# Patient Record
Sex: MALE | Race: WHITE | Employment: OTHER | URBAN - METROPOLITAN AREA
[De-identification: names, ages, dates, MRNs, and addresses within clinical notes are randomized per-mention and may not be internally consistent; named-entity substitution may affect disease eponyms.]

---

## 2019-03-31 ENCOUNTER — HOSPITAL ENCOUNTER (EMERGENCY)
Age: 39
Discharge: HOME | End: 2019-03-31
Payer: COMMERCIAL

## 2019-03-31 VITALS
WEIGHT: 315 LBS | DIASTOLIC BLOOD PRESSURE: 77 MMHG | BODY MASS INDEX: 38.36 KG/M2 | OXYGEN SATURATION: 98 % | RESPIRATION RATE: 15 BRPM | SYSTOLIC BLOOD PRESSURE: 135 MMHG | HEIGHT: 76 IN | TEMPERATURE: 97.8 F | HEART RATE: 85 BPM

## 2019-03-31 DIAGNOSIS — M62.838 SPASM OF MUSCLE: Primary | ICD-10-CM

## 2019-03-31 DIAGNOSIS — S39.012A STRAIN OF LUMBAR REGION, INITIAL ENCOUNTER: ICD-10-CM

## 2019-03-31 PROCEDURE — 96374 THER/PROPH/DIAG INJ IV PUSH: CPT

## 2019-03-31 PROCEDURE — 6360000002 HC RX W HCPCS: Performed by: PHYSICIAN ASSISTANT

## 2019-03-31 PROCEDURE — 99283 EMERGENCY DEPT VISIT LOW MDM: CPT

## 2019-03-31 PROCEDURE — 96375 TX/PRO/DX INJ NEW DRUG ADDON: CPT

## 2019-03-31 RX ORDER — ATORVASTATIN CALCIUM 10 MG/1
40 TABLET, FILM COATED ORAL DAILY
COMMUNITY

## 2019-03-31 RX ORDER — OMEGA-3-ACID ETHYL ESTERS 1 G/1
2 CAPSULE, LIQUID FILLED ORAL 2 TIMES DAILY
COMMUNITY

## 2019-03-31 RX ORDER — ORPHENADRINE CITRATE 30 MG/ML
60 INJECTION INTRAMUSCULAR; INTRAVENOUS ONCE
Status: COMPLETED | OUTPATIENT
Start: 2019-03-31 | End: 2019-03-31

## 2019-03-31 RX ORDER — LISINOPRIL AND HYDROCHLOROTHIAZIDE 25; 20 MG/1; MG/1
1 TABLET ORAL DAILY
COMMUNITY

## 2019-03-31 RX ORDER — CYCLOBENZAPRINE HCL 10 MG
10 TABLET ORAL 3 TIMES DAILY PRN
Qty: 20 TABLET | Refills: 0 | Status: SHIPPED | OUTPATIENT
Start: 2019-03-31 | End: 2019-04-07

## 2019-03-31 RX ORDER — AMLODIPINE BESYLATE 10 MG/1
10 TABLET ORAL DAILY
COMMUNITY

## 2019-03-31 RX ORDER — LIDOCAINE 50 MG/G
1 PATCH TOPICAL DAILY
Qty: 30 PATCH | Refills: 0 | Status: SHIPPED | OUTPATIENT
Start: 2019-03-31 | End: 2019-04-30

## 2019-03-31 RX ORDER — ATORVASTATIN CALCIUM 40 MG/1
40 TABLET, FILM COATED ORAL DAILY
COMMUNITY

## 2019-03-31 RX ORDER — KETOROLAC TROMETHAMINE 30 MG/ML
60 INJECTION, SOLUTION INTRAMUSCULAR; INTRAVENOUS ONCE
Status: COMPLETED | OUTPATIENT
Start: 2019-03-31 | End: 2019-03-31

## 2019-03-31 RX ORDER — NAPROXEN 500 MG/1
500 TABLET ORAL 2 TIMES DAILY
Qty: 20 TABLET | Refills: 0 | Status: SHIPPED | OUTPATIENT
Start: 2019-03-31

## 2019-03-31 RX ADMIN — ORPHENADRINE CITRATE 60 MG: 60 INJECTION INTRAMUSCULAR; INTRAVENOUS at 18:01

## 2019-03-31 RX ADMIN — KETOROLAC TROMETHAMINE 60 MG: 30 INJECTION, SOLUTION INTRAMUSCULAR at 18:02

## 2019-03-31 ASSESSMENT — PAIN DESCRIPTION - LOCATION: LOCATION: BACK

## 2019-03-31 ASSESSMENT — PAIN SCALES - GENERAL
PAINLEVEL_OUTOF10: 8
PAINLEVEL_OUTOF10: 4
PAINLEVEL_OUTOF10: 10

## 2019-03-31 ASSESSMENT — ENCOUNTER SYMPTOMS
ABDOMINAL PAIN: 0
COLOR CHANGE: 0
SHORTNESS OF BREATH: 0
VOMITING: 0
NAUSEA: 0
WHEEZING: 0
BACK PAIN: 1

## 2019-03-31 NOTE — ED PROVIDER NOTES
[facility] [facility]  eMERGENCY dEPARTMENT eNCOUnter        Pt Name: Katherine Quintero  MRN: [id number]  [place] 1980  Date of evaluation: 3/31/2019  Provider: Ifeoma Sy PA-C  PCP: Nicole Donovan MD    This patient was not seen and evaluated by the attending physician No att. providers found. CHIEF COMPLAINT       Chief Complaint   Patient presents with    Back Pain     back pain after lifting heavy objects Friday morning. HISTORY OF PRESENT ILLNESS   (Location/Symptom, Timing/Onset, Context/Setting, Quality, Duration, Modifying Factors, Severity)  Note limiting factors. Katherine Quintero is a 43 y.o. male patient presents the emergency department for evaluation of low back pain. Patient states he was lifting heavy objects on Friday when he strained his lower back. Patient has been using icy hot and ibuprofen at home with moderate relief. Patient states then he started having muscle spasms in his upper back. The patient rates his current pain an 8/10. He denies any other symptoms including headache, lightheadedness, dizziness, chest pain, shortness of breath, abdominal pain, nausea, vomiting, diarrhea. He denies any groin numbness or loss of bowel or bladder. He denies any numbness or tingling down into his legs. Nursing Notes were all reviewed and agreed with or any disagreements were addressed  in the HPI. REVIEW OF SYSTEMS    (2-9 systems for level 4, 10 or more for level 5)     Review of Systems   Constitutional: Negative for fatigue and fever. HENT: Negative. Eyes: Negative for visual disturbance. Respiratory: Negative for shortness of breath and wheezing. Cardiovascular: Negative for chest pain and palpitations. Gastrointestinal: Negative for abdominal pain, nausea and vomiting. Genitourinary: Negative for dysuria. Musculoskeletal: Positive for back pain.  Negative for arthralgias, gait problem, joint swelling, myalgias, neck pain and neck stiffness. Skin: Negative for color change, pallor, rash and wound. Neurological: Negative for dizziness, syncope, light-headedness and headaches. Positives and Pertinent negatives as per HPI. Except as noted abovein the ROS, all other systems were reviewed and negative. PAST MEDICAL HISTORY     Past Medical History:   Diagnosis Date    Hypertension     Sleep apnea          SURGICAL HISTORY     Past Surgical History:   Procedure Laterality Date    LAPAROSCOPIC APPENDECTOMY  1/1/13    Laparoscopic appendectomy         CURRENTMEDICATIONS       Previous Medications    ATORVASTATIN (LIPITOR) 10 MG TABLET    Take by mouth daily    LISINOPRIL PO    Take by mouth    OMEGA-3 ACID ETHYL ESTERS (LOVAZA) 1 G CAPSULE    Take 2 g by mouth 2 times daily    OMEGA-3 FATTY ACIDS (OMEGA-3 FISH OIL PO)    Take by mouth 2 times daily    ONDANSETRON (ZOFRAN ODT) 4 MG DISINTEGRATING TABLET    Take 1-2 tablets by mouth every 12 hours as needed for Nausea. May Sub regular tablet (non-ODT) if insurance does not cover ODT. ALLERGIES     Patient has no known allergies. FAMILYHISTORY     No family history on file.        SOCIAL HISTORY       Social History     Socioeconomic History    Marital status:      Spouse name: Not on file    Number of children: Not on file    Years of education: Not on file    Highest education level: Not on file   Occupational History    Not on file   Social Needs    Financial resource strain: Not on file    Food insecurity:     Worry: Not on file     Inability: Not on file    Transportation needs:     Medical: Not on file     Non-medical: Not on file   Tobacco Use    Smoking status: Never Smoker   Substance and Sexual Activity    Alcohol use: Yes     Comment: socially    Drug use: Not on file    Sexual activity: Yes     Partners: Female   Lifestyle    Physical activity:     Days per week: Not on file     Minutes per session: Not on file    Stress: Not on file   Relationships    Social connections:     Talks on phone: Not on file     Gets together: Not on file     Attends [employer] service: Not on file     Active member of club or organization: Not on file     Attends meetings of clubs or organizations: Not on file     Relationship status: Not on file    Intimate partner violence:     Fear of current or ex partner: Not on file     Emotionally abused: Not on file     Physically abused: Not on file     Forced sexual activity: Not on file   Other Topics Concern    Not on file   Social History Narrative    Not on file       SCREENINGS             PHYSICAL EXAM    (up to 7 for level 4, 8 or more for level 5)     ED Triage Vitals [03/31/19 1634]   BP Temp Temp Source Pulse Resp SpO2 Height Weight   135/77 97.8 °F (36.6 °C) Oral 85 15 98 % 6' 4\" (1.93 m) (!) 330 lb (149.7 kg)       Physical Exam   Constitutional: He is oriented to person, place, and time. He appears well-developed and well-nourished. HENT:   Head: Normocephalic and atraumatic. Nose: Nose normal.   Eyes: Right eye exhibits no discharge. Left eye exhibits no discharge. Neck: Normal range of motion. Neck supple. Cardiovascular: Normal rate, regular rhythm and normal heart sounds. Exam reveals no gallop. No murmur heard. Pulmonary/Chest: Effort normal and breath sounds normal. No respiratory distress. He has no wheezes. He has no rales. Musculoskeletal: Normal range of motion. Cervical back: Normal.        Thoracic back: Normal.        Lumbar back: Normal.        Back:    Neurological: He is alert and oriented to person, place, and time. Skin: Skin is warm and dry. He is not diaphoretic. No pallor. Psychiatric: He has a normal mood and affect. His behavior is normal.   Nursing note and vitals reviewed. DIAGNOSTIC RESULTS   LABS:    Labs Reviewed - No data to display    All other labs were within normal range or not returned as of this dictation. EKG:  All EKG's are

## 2019-05-10 ENCOUNTER — HOSPITAL ENCOUNTER (OUTPATIENT)
Dept: GENERAL RADIOLOGY | Age: 39
Discharge: HOME | End: 2019-05-10
Payer: COMMERCIAL

## 2019-05-10 ENCOUNTER — HOSPITAL ENCOUNTER (OUTPATIENT)
Age: 39
Discharge: HOME | End: 2019-05-10
Payer: COMMERCIAL

## 2019-05-10 DIAGNOSIS — M54.6 THORACIC BACK PAIN, UNSPECIFIED BACK PAIN LATERALITY, UNSPECIFIED CHRONICITY: ICD-10-CM

## 2019-05-10 DIAGNOSIS — M54.5 LOW BACK PAIN, UNSPECIFIED BACK PAIN LATERALITY, UNSPECIFIED CHRONICITY, WITH SCIATICA PRESENCE UNSPECIFIED: ICD-10-CM

## 2019-05-10 PROCEDURE — 72070 X-RAY EXAM THORAC SPINE 2VWS: CPT

## 2019-05-10 PROCEDURE — 72100 X-RAY EXAM L-S SPINE 2/3 VWS: CPT

## 2019-05-21 ENCOUNTER — HOSPITAL ENCOUNTER (OUTPATIENT)
Dept: PHYSICAL THERAPY | Age: 39
Setting detail: THERAPIES SERIES
Discharge: HOME | End: 2019-05-21
Payer: COMMERCIAL

## 2019-05-21 PROCEDURE — 97110 THERAPEUTIC EXERCISES: CPT | Performed by: PHYSICAL THERAPIST

## 2019-05-21 PROCEDURE — 97161 PT EVAL LOW COMPLEX 20 MIN: CPT | Performed by: PHYSICAL THERAPIST

## 2019-05-21 PROCEDURE — 97140 MANUAL THERAPY 1/> REGIONS: CPT | Performed by: PHYSICAL THERAPIST

## 2019-05-21 NOTE — PLAN OF CARE
[place], [facility], [address]  Phone: [phone]   Fax: [phone]     Physical Therapy Certification    Dear Referring Practitioner: Dr. Nakia Aguilar,    We had the pleasure of evaluating the following patient for physical therapy services at [address] [place] [address]. A summary of our findings can be found in the initial assessment below. This includes our plan of care. If you have any questions or concerns regarding these findings, please do not hesitate to contact me at the office phone number checked above. Thank you for the referral.       Physician Signature:_______________________________Date:__________________  By signing above (or electronic signature), therapists plan is approved by physician      Patient: Marquis Bentley \"Lino\"  : 1980   MRN: [id number]  Referring Physician: Referring Practitioner: Dr. Nakia Aguilar      Evaluation Date: 2019      Medical Diagnosis Information:  Diagnosis: M54.5 low back pain   Treatment Diagnosis: M54.6 thoracic pain                                         Insurance information: PT Insurance Information: [employer]     Precautions/ Contra-indications: none  Latex Allergy:  [x]NO      []YES  Preferred Language for Healthcare:   [x]English       []other:    SUBJECTIVE: Patient stated complaint: early April pt. Was helping someone carry a very heavy item into the car and the other person dropped their end causing R sided low back pain; pt. Notes that he has had this type of pulled muscle but then a couple of days later pt. States that he started to get bad muscle spasms in mid back on R side; pt.  Denies sleep disturbances but notes that his back is very stiff when first getting up; pain/stiffness/muscle spasm is worse after prolonged positioning (30+ minutes); currently spasms are occurring 1-2x/day, and has tightness (feeling prior to spasm) about additional 2-4x/day, pt. 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    Living Status/Prior Level of Function: Prior to this injury / incident, pt was independent with ADLs and IADLs, coaching baseball, 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      OBJECTIVE:     Palpation: tenderness lower thoracic paraspinals    Functional Mobility/Transfers: independent    Posture: slight lateral curve throughout spine with concavity to the R   Skin fold L ~T8, skin fold R ~T10    Breathing: increased lower rib excursion on L than R   No gross individual rib displacement    Gait: (include devices/WB status) decreased trunk rotation, mild guarding    Bandages/Dressings/Incisions: NA    Dermatomes Normal Abnormal Comments   inguinal area (L1)  x     anterior mid-thigh (L2) x     distal ant thigh/med knee (L3) x     medial lower leg and foot (L4) x     lateral lower leg and foot (L5) x     posterior calf (S1) x     medial calcaneus (S2) x         Myotomes Normal Abnormal Comments   Hip flexion (L1-L2) x     Knee extension (L2-L4) x     Dorsiflexion (L4-L5) x     Great Toe Ext (L5) x     Ankle Eversion (S1-S2) x     Ankle PF(S1-S2) x         Reflexes - NT Normal Abnormal Comments   S1-2 Seated achilles      S1-2 Prone knee bend      L3-4 Patellar tendon      Clonus      Babinski          ROM  Comments   Lumbothoracic Flex ~50% restriction \"tightness\"   Lumbothoracic Ext ~25% restriction      ROM LEFT RIGHT Comments   Thoracic Side Bend Slight restricted Increased mobility -hinge point ~ T10 (allows greater motion R and restricted L)   Thoracic Rotation Slight restriction Slight restriction symmetrical   Hamstring Flex NT NT                    Strength LEFT RIGHT Comments   Multifidus NT NT    Transverse Ab NT NT        Joint mobility: thoracic spine mobility   []Normal    [x]Hypo - throughout   []Hyper    Orthopedic Special Tests: NT      [x] Patient history, allergies, meds reviewed. Medical chart reviewed. See intake form. Review Of Systems (ROS):  [x]Performed Review of systems (Integumentary, CardioPulmonary, Neurological) by intake and observation. Intake form has been scanned into medical record. Patient has been instructed to contact their primary care physician regarding ROS issues if not already being addressed at this time.       Co-morbidities/Complexities (which will affect course of rehabilitation):   []None           Arthritic conditions   []Rheumatoid arthritis (M05.9)  []Osteoarthritis (M19.91)   Cardiovascular conditions   [x]Hypertension (I10)  []Hyperlipidemia (E78.5)  []Angina pectoris (I20)  []Atherosclerosis (I70)   Musculoskeletal conditions   []Disc pathology   []Congenital spine pathologies   []Prior surgical intervention  []Osteoporosis (M81.8)  []Osteopenia (M85.8)   Endocrine conditions   []Hypothyroid (E03.9)  []Hyperthyroid Gastrointestinal conditions   []Constipation (G56.15)   Metabolic conditions   []Morbid obesity (E66.01)  []Diabetes type 1(E10.65) or 2 (E11.65)   []Neuropathy (G60.9)     Pulmonary conditions   []Asthma (J45)  []Coughing   []COPD (J44.9)   Psychological Disorders  []Anxiety (F41.9)  []Depression (F32.9)   []Other:   []Other:           Barriers to/and or personal factors that will affect rehab potential:              []Age  []Sex    []Smoker              []Motivation/Lack of Motivation                        [x]Co-Morbidities              []Cognitive Function, education/learning barriers              []Environmental, home barriers functional activities including lifting without increased symptoms or restriction. 5. 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        Electronically signed by:  William Mgaaña PT

## 2019-05-21 NOTE — FLOWSHEET NOTE
[address] [facility]    Physical Therapy Daily Treatment Note  Date:  2019    Patient Name:  Esau Verma    :  1980  MRN: [id number]  Medical/Treatment Diagnosis Information:  Diagnosis: M54.5 low back pain  Treatment Diagnosis: M54.6 thoracic pain  Insurance/Certification information:  PT Insurance Information: [employer]  Physician Information:  Referring Practitioner: Dr. Janis Shipley of care signed (Y/N):     Date of Patient follow up with Physician:     Progress Note: []  Yes  []  No  Next due by: Visit #10      Latex Allergy:  [x]NO      []YES  Preferred Language for Healthcare:   [x]English       []other:    Visit # Insurance Allowable Date Range   1 30 n/a     Pain level:  0/10 currently, 6-7/10 with muscle spasm     SUBJECTIVE:  See eval    OBJECTIVE: See eval   Observation:    Test measurements:      RESTRICTIONS/PRECAUTIONS: none    Exercises/Interventions:     Therapeutic Exercise / Activities Resistance / level sets/sec reps notes   Open book - B  10\" 10    Cat/camel  10\" 10    Seated thoracic extension in chair  10\" 10           W breathing  1 10           Lift training npv                           Neuromuscular Re-ed                                          Manual Intervention        Prone PA T8-11 UPA 4'     GISTM/STM R lower thoracic paraspinals 5'     Lumbar Manip       SI Manip       Hip belt mobs       Hip LA distraction                              Pt. Education:  -pt educated on diagnosis, prognosis and expectations for rehab  -pt provided with written and illustrated instructions for HEP  -all pt questions were answered    Therapeutic Exercise and NMR EXR  [x] (33231) Provided verbal/tactile cueing for activities related to strengthening, flexibility, endurance, ROM  for improvements in proximal hip and core control with self care, mobility, lifting and ambulation.  [] (44410) Provided verbal/tactile cueing for activities related to improving balance, coordination, kinesthetic sense, posture, motor skill, proprioception  to assist with core control in self care, mobility, lifting, and ambulation. Therapeutic Activities:    [] (56810 or 50965) Provided verbal/tactile cueing for activities related to improving balance, coordination, kinesthetic sense, posture, motor skill, proprioception and motor activation to allow for proper function  with self care and ADLs  [] (63835) Provided training and instruction to the patient for proper core and proximal hip recruitment and positioning with ambulation re-education     Home Exercise Program:    [x] (17922) Reviewed/Progressed HEP activities related to strengthening, flexibility, endurance, ROM of core, proximal hip and LE for functional self-care, mobility, lifting and ambulation   [] (23222) Reviewed/Progressed HEP activities related to improving balance, coordination, kinesthetic sense, posture, motor skill, proprioception of core, proximal hip and LE for self care, mobility, lifting, and ambulation      Manual Treatments:  PROM / STM / Oscillations-Mobs:  G-I, II, III, IV (PA's, Inf., Post.)  [x] (42634) Provided manual therapy to mobilize proximal hip and LS spine soft tissue/joints for the purpose of modulating pain, promoting relaxation,  increasing ROM, reducing/eliminating soft tissue swelling/inflammation/restriction, improving soft tissue extensibility and allowing for proper ROM for normal function with self care, mobility, lifting and ambulation.      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 Independent in HEP and progression per patient tolerance, in order to prevent re-injury. 2. Patient will have a decrease in pain to facilitate improvement in movement, function, and ADLs as indicated by Functional Deficits. Long Term Goals: To be achieved in: 4-6 weeks  1. Disability index score of 10% or less for the TL to assist with reaching prior level of function. 2. Patient will demonstrate increased AROM to WNL, functional TS mobility, and symmetrical rib excursion with breathing to allow for proper joint functioning as indicated by patients Functional Deficits. 3. Patient will demonstrate an increase in Strength to good core activation to allow for proper functional mobility as indicated by patients Functional Deficits. 4. Patient will return to functional activities including lifting without increased symptoms or restriction. 5. 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 Progression Towards Functional goals:  [] Patient is progressing as expected towards functional goals listed. [] Progression is slowed due to complexities listed. [] Progression has been slowed due to co-morbidities.   [x] Plan just implemented, too soon to assess goals progression  [] Other:     Persisting Functional Limitations/Impairments:  []Sitting []Standing   []Walking []Squatting/bending    []Stairs [x]ADL's    [x]Transfers [x]Reaching  [x]Housework [x]Job related tasks  [x]Driving [x]Sports/Recreation   []Other:    ASSESSMENT:  See eval  Treatment/Activity Tolerance:  [x] Patient tolerated treatment well [] Patient limited by fatique  [] Patient limited by pain  [] Patient limited by other medical complications  [] Other:      Prognosis: [x] Good [] Fair  [] Poor    Patient Requires Follow-up: [x] Yes  [] No    PLAN: See eval. PT 1-2x / week for 4-6 weeks.    [] Continue per plan of care [] Alter current plan (see comments)  [x] Plan of care initiated [] Hold pending MD visit [] Discharge    Electronically signed by: Michael Garcia PT, DPT

## 2019-05-23 ENCOUNTER — HOSPITAL ENCOUNTER (OUTPATIENT)
Dept: PHYSICAL THERAPY | Age: 39
Setting detail: THERAPIES SERIES
Discharge: HOME | End: 2019-05-23
Payer: COMMERCIAL

## 2019-05-23 PROCEDURE — 97140 MANUAL THERAPY 1/> REGIONS: CPT | Performed by: PHYSICAL THERAPIST

## 2019-05-23 PROCEDURE — 97530 THERAPEUTIC ACTIVITIES: CPT | Performed by: PHYSICAL THERAPIST

## 2019-05-23 PROCEDURE — 97110 THERAPEUTIC EXERCISES: CPT | Performed by: PHYSICAL THERAPIST

## 2019-05-23 NOTE — FLOWSHEET NOTE
[address] [facility]    Physical Therapy Daily Treatment Note  Date:  2019    Patient Name:  Toney Huffman    :  1980  MRN: [id number]  Medical/Treatment Diagnosis Information:  Diagnosis: M54.5 low back pain  Treatment Diagnosis: M54.6 thoracic pain  Insurance/Certification information:  PT [facility] Information[place] [facility] [place]  Physician Information:  Referring Practitioner: Dr. Akin Boothe of care signed (Y/N): Y    Date of Patient follow up with Physician:     Progress Note: []  Yes  []  No  Next due by: Visit #10      Latex Allergy:  [x]NO      []YES  Preferred Language for Healthcare:   [x]English       []other:    Visit # Insurance Allowable Date Range   2 30 n/a     Pain level:  0/10 currently, 8/10 with muscle spasm (on R side) caused by quick extension and rotation to his side to avoid ball     SUBJECTIVE:  Pt. Reports feeling better after last therapy session, but still is feeling stiff in the morning. Pt noted only having one R thoracic muscle spasm after trunk extending backwards and rotating to the L while depressing his L shoulder to avoid a ball coming at him. Pt. States he feels tight ever since the incident but reports he hasn't had any other muscle spasms since last session. Pt. Is compliant with HEP and feels relief with exercises.       OBJECTIVE: See eval   Observation:    Test measurements:      RESTRICTIONS/PRECAUTIONS: none    Exercises/Interventions:     Therapeutic Exercise / Activities Resistance / level sets/sec reps notes   Open book - B  10\" 10    Cat/camel  10\" 10    Seated thoracic extension in chair  10\" 10    R levator self stretch  10\" 10 Start , cueing for posturing          W breathing  1 10           Lift training #0  #20  1  1 15  15 Start                  Scapular retraction  0# 3 10           Neuromuscular Re-ed                                          Manual Intervention        Prone PA T8-11 UPA 6' GISTM/STM R lower thoracic paraspinals, trigger point 7'     Lumbar Manip       SI Manip       Hip belt mobs       Hip LA distraction                              Pt. Education:  -reviewed HEP  - educated on usage of proper body mechanics and posture     Therapeutic Exercise and NMR EXR  [x] (72406) Provided verbal/tactile cueing for activities related to strengthening, flexibility, endurance, ROM  for improvements in proximal hip and core control with self care, mobility, lifting and ambulation.  [] (81242) Provided verbal/tactile cueing for activities related to improving balance, coordination, kinesthetic sense, posture, motor skill, proprioception  to assist with core control in self care, mobility, lifting, and ambulation.      Therapeutic Activities:    [x] (35668 or 21915) Provided verbal/tactile cueing for activities related to improving balance, coordination, kinesthetic sense, posture, motor skill, proprioception and motor activation to allow for proper function  with self care and ADLs  [] (95882) Provided training and instruction to the patient for proper core and proximal hip recruitment and positioning with ambulation re-education     Home Exercise Program:    [x] (20212) Reviewed/Progressed HEP activities related to strengthening, flexibility, endurance, ROM of core, proximal hip and LE for functional self-care, mobility, lifting and ambulation   [] (70308) Reviewed/Progressed HEP activities related to improving balance, coordination, kinesthetic sense, posture, motor skill, proprioception of core, proximal hip and LE for self care, mobility, lifting, and ambulation      Manual Treatments:  PROM / STM / Oscillations-Mobs:  G-I, II, III, IV (PA's, Inf., Post.)  [x] (64714) Provided manual therapy to mobilize proximal hip and LS spine soft tissue/joints for the purpose of modulating pain, promoting relaxation,  increasing ROM, reducing/eliminating soft tissue swelling/inflammation/restriction, improving soft tissue extensibility and allowing for proper ROM for normal function with self care, mobility, lifting and ambulation. 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 Independent in HEP and progression per patient tolerance, in order to prevent re-injury. 2. Patient will have a decrease in pain to facilitate improvement in movement, function, and ADLs as indicated by Functional Deficits. Long Term Goals: To be achieved in: 4-6 weeks  1. Disability index score of 10% or less for the TL to assist with reaching prior level of function. 2. Patient will demonstrate increased AROM to WNL, functional TS mobility, and symmetrical rib excursion with breathing to allow for proper joint functioning as indicated by patients Functional Deficits. 3. Patient will demonstrate an increase in Strength to good core activation to allow for proper functional mobility as indicated by patients Functional Deficits. 4. Patient will return to functional activities including lifting without increased symptoms or restriction. 5. 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 Progression Towards Functional goals:  [] Patient is progressing as expected towards functional goals listed. [] Progression is slowed due to complexities listed.   [] Progression has been slowed due to co-morbidities. [x] Plan just implemented, too soon to assess goals progression  [] Other: Pt. Tolerated therapy without complaints. Pt. Educated on importance to use proper body mechanics at all times     Persisting Functional Limitations/Impairments:  []Sitting []Standing   []Walking []Squatting/bending    []Stairs [x]ADL's    [x]Transfers [x]Reaching  [x]Housework [x]Job related tasks  [x]Driving [x]Sports/Recreation   []Other:    ASSESSMENT:    Treatment/Activity Tolerance:  [x] Patient tolerated treatment well [] Patient limited by fatique  [] Patient limited by pain  [] Patient limited by other medical complications  [x] Other:  Pt. Tolerated therapy today without complaints. Pt. Demonstrates improvements in functional movements but continues to have moderate hypomobility throughout thoracic spine. Cueing required to decrease accessory muscle activation with W breathing. Pt. With increased muscle tension throughout R thoracic paraspinals addressed through manual treatment. Pt. Demonstrates good technique with stretches in HEP. Pt. Educated on correct lifting technique and initially required cueing for correct body mechanics, but then able to repeat correctly following detailed instruction. Pt. Will continue to benefit from skilled therapy in order to restore mobility and decrease symptoms. Prognosis: [x] Good [] Fair  [] Poor    Patient Requires Follow-up: [x] Yes  [] No    PLAN: See eval. PT 1-2x / week for 4-6 weeks.    [x] Continue per plan of care [] Alter current plan (see comments)  [] Plan of care initiated [] Hold pending MD visit [] Discharge    Electronically signed by: Felipe Santos PT, DPT

## 2019-06-04 ENCOUNTER — HOSPITAL ENCOUNTER (OUTPATIENT)
Dept: PHYSICAL THERAPY | Age: 39
Setting detail: THERAPIES SERIES
Discharge: HOME | End: 2019-06-04
Payer: COMMERCIAL

## 2019-06-04 PROCEDURE — 97530 THERAPEUTIC ACTIVITIES: CPT | Performed by: PHYSICAL THERAPIST

## 2019-06-04 PROCEDURE — 97140 MANUAL THERAPY 1/> REGIONS: CPT | Performed by: PHYSICAL THERAPIST

## 2019-06-04 PROCEDURE — 97110 THERAPEUTIC EXERCISES: CPT | Performed by: PHYSICAL THERAPIST

## 2019-06-04 NOTE — FLOWSHEET NOTE
[address] [facility]    Physical Therapy Daily Treatment Note  Date:  2019    Patient Name:  Marquis Bentley    :  1980  MRN: [id number]  Medical/Treatment Diagnosis Information:  Diagnosis: M54.5 low back pain  Treatment Diagnosis: M54.6 thoracic pain  Insurance/Certification information:  PT Insurance Information: [facility]  Physician Information:  Referring Practitioner: Dr. Jerad Willis of care signed (Y/N): Y    Date of Patient follow up with Physician:     Progress Note: []  Yes  [x]  No  Next due by: Visit #10      Latex Allergy:  [x]NO      []YES  Preferred Language for Healthcare:   [x]English       []other:    Visit # Insurance Allowable Date Range   3 30 n/a     Pain level:  0/10     SUBJECTIVE:  Pt states that overall his back feels good. At this time and recently there is no pain and there hasn't been a spasm since [facility]. 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 The only activity that gives him any trouble is sitting in low chairs for brief amounts of time which upon rising gives him slight soreness in his lower abdomen and slight stiffness through his low back that alleviates quickly. Pt. Notes that he is only taking the muscle relaxer at night at this time. OBJECTIVE:   R Mid/Low thoracic stiffness compared to L. No TTP in paraspinals.       RESTRICTIONS/PRECAUTIONS: none    Exercises/Interventions:     Therapeutic Exercise / Activities 28' Resistance / level sets/sec reps notes   Open book - B   10\" 10    Cat/camel   10\" 10 Cued for exhale with rounding and inhale with flattening back   Seated thoracic extension in chair   10\" 10 Cued for B UE placement   R levator self stretch   10\" 10 Start , cueing for posturing          W breathing   1 10           Lift training  #25 2   15   Start   ^6/4  Cued for proper body mechanics     Bird-Dog Alternating   2 5 each way HEP Added  Oscillations-Mobs:  G-I, II, III, IV (PA's, Inf., Post.)  [x] (27526) Provided manual therapy to mobilize proximal hip and LS spine soft tissue/joints for the purpose of modulating pain, promoting relaxation,  increasing ROM, reducing/eliminating soft tissue swelling/inflammation/restriction, improving soft tissue extensibility and allowing for proper ROM for normal function with self care, mobility, lifting and ambulation. 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 Independent in HEP and progression per patient tolerance, in order to prevent re-injury. MET  2. Patient will have a decrease in pain to facilitate improvement in movement, function, and ADLs as indicated by Functional Deficits. MET    Long Term Goals: To be achieved in: 4-6 weeks  1. Disability index score of 10% or less for the TL to assist with reaching prior level of function. 2. Patient will demonstrate increased AROM to WNL, functional TS mobility, and symmetrical rib excursion with breathing to allow for proper joint functioning as indicated by patients Functional Deficits. 3. Patient will demonstrate an increase in Strength to good core activation to allow for proper functional mobility as indicated by patients Functional Deficits. 4. Patient will return to functional activities including lifting without increased symptoms or restriction.    5. 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 Progression Towards Functional goals:  [x] Patient is progressing as expected towards functional goals listed. [] Progression is slowed due to complexities listed. [] Progression has been slowed due to co-morbidities. [] Plan just implemented, too soon to assess goals progression  [] Other:     Persisting Functional Limitations/Impairments:  []Sitting []Standing   []Walking []Squatting/bending    []Stairs [x]ADL's    [x]Transfers [x]Reaching  [x]Housework [x]Job related tasks  [x]Driving [x]Sports/Recreation   []Other:    ASSESSMENT:    Pt has been pain free and able to participate in his daily activities without many complaints other than sitting for brief amounts of time in low chairs which causes lower abdominal and low back soreness/stiffness upon rising which quickly alleviates. Pt continues to tolerate therapy well and has progressed with exercises with additions of new ones for core strengthening. Pt can benefit from further core strengthening to address his complaint of lower abdominal soreness upon having to bilaterally flex hips to get out of his low car. Pt still requires cueing for proper biomechanics especially with lifting activities. Pt is progressing towards d/c and session frequency will decrease to 1x/week until there are no/less complaints. Treatment/Activity Tolerance:  [x] Patient tolerated treatment well [] Patient limited by fatique  [] Patient limited by pain  [] Patient limited by other medical complications  [] Other:      Prognosis: [x] Good [] Fair  [] Poor    Patient Requires Follow-up: [x] Yes  [] No    PLAN: See eval. PT 1x / week for 4-6 weeks.    [x] Continue per plan of care [] Alter current plan (see comments)  [] Plan of care initiated [] Hold pending MD visit [] Discharge    Electronically signed by: Kelsi Arroyo PT, DPT    Jeremie Chester, SPT    Therapist was present, directed

## 2019-06-06 ENCOUNTER — APPOINTMENT (OUTPATIENT)
Dept: PHYSICAL THERAPY | Age: 39
End: 2019-06-06
Payer: COMMERCIAL

## 2019-06-13 ENCOUNTER — APPOINTMENT (OUTPATIENT)
Dept: PHYSICAL THERAPY | Age: 39
End: 2019-06-13
Payer: COMMERCIAL

## 2019-06-20 ENCOUNTER — APPOINTMENT (OUTPATIENT)
Dept: PHYSICAL THERAPY | Age: 39
End: 2019-06-20
Payer: COMMERCIAL

## 2019-06-25 ENCOUNTER — APPOINTMENT (OUTPATIENT)
Dept: PHYSICAL THERAPY | Age: 39
End: 2019-06-25
Payer: COMMERCIAL

## 2020-02-11 ENCOUNTER — HOSPITAL ENCOUNTER (OUTPATIENT)
Dept: ULTRASOUND IMAGING | Age: 40
Discharge: HOME | End: 2020-02-11
Payer: COMMERCIAL

## 2020-02-11 PROCEDURE — 76705 ECHO EXAM OF ABDOMEN: CPT

## 2020-05-12 ENCOUNTER — HOSPITAL ENCOUNTER (OUTPATIENT)
Dept: GENERAL RADIOLOGY | Age: 40
Discharge: HOME | End: 2020-05-12
Payer: COMMERCIAL

## 2020-05-12 ENCOUNTER — HOSPITAL ENCOUNTER (OUTPATIENT)
Age: 40
Discharge: HOME | End: 2020-05-12
Payer: COMMERCIAL

## 2020-05-12 PROCEDURE — 72040 X-RAY EXAM NECK SPINE 2-3 VW: CPT

## 2021-04-16 ENCOUNTER — IMMUNIZATION (OUTPATIENT)
Dept: PRIMARY CARE CLINIC | Age: 41
End: 2021-04-16
Payer: COMMERCIAL

## 2021-04-16 PROCEDURE — 91301 COVID-19, MODERNA VACCINE 100MCG/0.5ML DOSE: CPT | Performed by: FAMILY MEDICINE

## 2021-04-16 PROCEDURE — 0011A COVID-19, MODERNA VACCINE 100MCG/0.5ML DOSE: CPT | Performed by: FAMILY MEDICINE

## 2021-05-14 ENCOUNTER — IMMUNIZATION (OUTPATIENT)
Dept: PRIMARY CARE CLINIC | Age: 41
End: 2021-05-14
Payer: COMMERCIAL

## 2021-05-14 PROCEDURE — 91301 COVID-19, MODERNA VACCINE 100MCG/0.5ML DOSE: CPT

## 2021-05-14 PROCEDURE — 0012A COVID-19, MODERNA VACCINE 100MCG/0.5ML DOSE: CPT

## 2023-03-13 ENCOUNTER — HOSPITAL ENCOUNTER (OUTPATIENT)
Age: 43
Discharge: HOME | End: 2023-03-13
Payer: COMMERCIAL

## 2023-03-13 ENCOUNTER — HOSPITAL ENCOUNTER (OUTPATIENT)
Dept: GENERAL RADIOLOGY | Age: 43
Discharge: HOME | End: 2023-03-13
Payer: COMMERCIAL

## 2023-03-13 DIAGNOSIS — M54.2 CERVICALGIA: ICD-10-CM

## 2023-03-13 PROCEDURE — 72040 X-RAY EXAM NECK SPINE 2-3 VW: CPT

## 2023-04-03 ENCOUNTER — HOSPITAL ENCOUNTER (OUTPATIENT)
Dept: PHYSICAL THERAPY | Age: 43
Setting detail: THERAPIES SERIES
Discharge: HOME | End: 2023-04-03
Payer: COMMERCIAL

## 2023-04-03 PROCEDURE — 97161 PT EVAL LOW COMPLEX 20 MIN: CPT

## 2023-04-03 PROCEDURE — 97530 THERAPEUTIC ACTIVITIES: CPT

## 2023-04-03 NOTE — PLAN OF CARE
Side-bending (C3) 5 5   Shoulder Shrug (C4) 5 5   Shoulder Flex 5 5   Shoulder Scap     Shoulder Abduction (C5) 5 5   Shoulder ER 5 5   Shoulder IR 5 5   Biceps (C6) 5 5   Triceps (C7) 5 5   Wrist Extension (C6) 5 5   Wrist Flexion (C7)           Thumb Abduction (C8)     Finger Abduction (T1)       Lower extremity myotomes:   []Normal     []Abnormal     Joint mobility:    []Normal    []Hypo   []Hyper    Orthopaedic Special Tests  Positive  Negative  NT Comments    Hautard's        Rhomberg       Sharps-Dav       Cervical Torsion / Body Rotation        C2 Kick       Modified Shear       Compression x      Distraction  x      Spurling  x   Right side w/ radicular symptoms down arm                          [x] Patient history, allergies, meds reviewed. Medical chart reviewed. See intake form. Review Of Systems (ROS):  [x]Performed Review of systems (Integumentary, CardioPulmonary, Neurological) by intake and observation. Intake form has been scanned into medical record. Patient has been instructed to contact their primary care physician regarding ROS issues if not already being addressed at this time.       Co-morbidities/Complexities (which will affect course of rehabilitation):   []None        []Hx of COVID   Arthritic conditions   []Rheumatoid arthritis (M05.9)  [x]Osteoarthritis (M19.91)  []Gout   Cardiovascular conditions   [x]Hypertension (I10)  []Hyperlipidemia (E78.5)  []Angina pectoris (I20)  []Atherosclerosis (I70)  []Pacemaker  []Hx of CABG/stent/  cardiac surgeries   Musculoskeletal conditions   []Disc pathology   []Congenital spine pathologies   []Osteoporosis (M81.8)  []Osteopenia (M85.8)  []Scoliosis       Endocrine conditions   []Hypothyroid (E03.9)  []Hyperthyroid Gastrointestinal conditions   []Constipation (B28.40)   Metabolic conditions   []Morbid obesity (E66.01)  []Diabetes type 1(E10.65) or 2 (E11.65)   []Neuropathy (G60.9)     Cardio/Pulmonary conditions   []Asthma (J45)  []Coughing

## 2023-04-04 NOTE — FLOWSHEET NOTE
driving/computer work. Home Exercise Program:    [x] (49317) Reviewed/Progressed HEP activities related to strengthening, flexibility, endurance, ROM of cervical, scapular, scapulothoracic and UE control with self care, reaching, carrying, lifting, house/yardwork, driving/computer work  [] (26285) Reviewed/Progressed HEP activities related to improving balance, coordination, kinesthetic sense, posture, motor skill, proprioception of cervical, scapular, scapulothoracic and UE control with self care, reaching, carrying, lifting, house/yardwork, driving/computer work      Manual Treatments:  PROM / STM / Oscillations-Mobs:  G-I, II, III, IV (PA's, Inf., Post.)  [] (71425) Provided manual therapy to mobilize soft tissue/joints of cervical/CT, scapular GHJ and UE for the purpose of decreasing headache, modulating pain, promoting relaxation,  increasing ROM, reducing/eliminating soft tissue swelling/inflammation/restriction, improving soft tissue extensibility and allowing for proper ROM for normal function with self care, reaching, carrying, lifting, house/yardwork, driving/computer work    Charges:  Timed Code Treatment Minutes: 25   Total Treatment Minutes: 35       [x] EVAL - LOW (96273)   [] EVAL - MOD (84103)  [] EVAL - HIGH (02995)  [] RE-EVAL (11319)  [] FS(70552) x       [] Ionto  [] NMR (23381) x       [] Vaso  [] Manual (53607) x       [] Ultrasound  [x] TA x  1      [] Mech Traction (58386)  [] Aquatic Therapy x     [] ES (un) (61384):   [] Home Management Training x  [] ES(attended) (01539)   [] Dry Needling 1-2 muscles (79087):  [] Dry Needling 3+ muscles (624551  [] Group:      [] Other:     GOALS:   Patient stated goal: To improve neck pain   [] Progressing: [] Met: [] Not Met: [] Adjusted     Therapist goals for Patient:   Short Term Goals: To be achieved in: 2 weeks  1. Independent in HEP and progression per patient tolerance, in order to prevent re-injury.    [] Progressing: [] Met: [] Not Met: []

## 2024-03-18 ENCOUNTER — HOSPITAL ENCOUNTER (EMERGENCY)
Age: 44
Discharge: HOME | End: 2024-03-18
Payer: COMMERCIAL

## 2024-03-18 VITALS
RESPIRATION RATE: 16 BRPM | HEART RATE: 99 BPM | TEMPERATURE: 98 F | OXYGEN SATURATION: 97 % | BODY MASS INDEX: 38.36 KG/M2 | DIASTOLIC BLOOD PRESSURE: 96 MMHG | SYSTOLIC BLOOD PRESSURE: 135 MMHG | HEIGHT: 76 IN | WEIGHT: 315 LBS

## 2024-03-18 DIAGNOSIS — M76.61 ACHILLES TENDINITIS, RIGHT LEG: Primary | ICD-10-CM

## 2024-03-18 PROCEDURE — 99283 EMERGENCY DEPT VISIT LOW MDM: CPT

## 2024-03-18 RX ORDER — HYDROCODONE BITARTRATE AND ACETAMINOPHEN 5; 325 MG/1; MG/1
1 TABLET ORAL EVERY 4 HOURS PRN
Qty: 18 TABLET | Refills: 0 | Status: SHIPPED | OUTPATIENT
Start: 2024-03-18 | End: 2024-03-21

## 2024-03-18 RX ORDER — ONDANSETRON 4 MG/1
4 TABLET, FILM COATED ORAL EVERY 8 HOURS PRN
Qty: 20 TABLET | Refills: 0 | Status: SHIPPED | OUTPATIENT
Start: 2024-03-18

## 2024-03-18 ASSESSMENT — PAIN DESCRIPTION - LOCATION: LOCATION: LEG;FOOT

## 2024-03-18 ASSESSMENT — ENCOUNTER SYMPTOMS
NAUSEA: 0
VOMITING: 0

## 2024-03-18 ASSESSMENT — PAIN DESCRIPTION - ORIENTATION: ORIENTATION: RIGHT

## 2024-03-18 ASSESSMENT — PAIN DESCRIPTION - DESCRIPTORS: DESCRIPTORS: DISCOMFORT

## 2024-03-18 ASSESSMENT — PAIN - FUNCTIONAL ASSESSMENT: PAIN_FUNCTIONAL_ASSESSMENT: 0-10

## 2024-03-18 ASSESSMENT — PAIN SCALES - GENERAL: PAINLEVEL_OUTOF10: 9

## 2024-03-18 NOTE — ED PROVIDER NOTES
(NORCO) 5-325 MG PER TABLET    Take 1 tablet by mouth every 4 hours as needed for Pain for up to 3 days. Intended supply: 3 days. Take lowest dose possible to manage pain Max Daily Amount: 6 tablets    ONDANSETRON (ZOFRAN) 4 MG TABLET    Take 1 tablet by mouth every 8 hours as needed for Nausea       DISCONTINUED MEDICATIONS:  Discontinued Medications    No medications on file              (Please note that portions of this note were completed with a voice recognition program.  Efforts were made to edit the dictations but occasionally words are mis-transcribed.)    Ashanti Hernandez PA-C (electronically signed)       Ashanti Hernandez PA-C  03/18/24 2878

## 2024-03-19 ENCOUNTER — TELEPHONE (OUTPATIENT)
Dept: ORTHOPEDIC SURGERY | Age: 44
End: 2024-03-19

## 2024-03-19 NOTE — TELEPHONE ENCOUNTER
Spoke with patient. He did not want to wait until April 2 to be seen. The saurabhnet was instructed to call [phone] to schedule an earlier appointment with another MD. Patient agreed and understood.

## 2024-03-19 NOTE — TELEPHONE ENCOUNTER
Did leave message regarding ED referral for an appointment. Upon return call please schedule with Dr. Prakash.

## 2024-03-19 NOTE — TELEPHONE ENCOUNTER
Appointment Request     Patient requesting earlier appointment: Yes  Appointment offered to patient: PATIENT REQUESTING A SOONER APPT OR TO CHANGE PHYSICIANS. OFFERED APRIL 2ND WITH [facility]. PATIENT NESTOR CHARLES  Patient Contact Number: [phone]

## 2024-03-21 ENCOUNTER — OFFICE VISIT (OUTPATIENT)
Dept: ORTHOPEDIC SURGERY | Age: 44
End: 2024-03-21
Payer: COMMERCIAL

## 2024-03-21 VITALS — HEIGHT: 76 IN | BODY MASS INDEX: 38.36 KG/M2 | RESPIRATION RATE: 16 BRPM | WEIGHT: 315 LBS

## 2024-03-21 DIAGNOSIS — M76.60 ACHILLES TENDON PAIN: Primary | ICD-10-CM

## 2024-03-21 PROCEDURE — 99203 OFFICE O/P NEW LOW 30 MIN: CPT | Performed by: ORTHOPAEDIC SURGERY

## 2024-03-21 NOTE — PROGRESS NOTES
CHIEF COMPLAINT: Right ankle / foot pain.    History:  Mr. Espinosa 43 y.o. male presents today for the first visit for evaluation of a right ankle pain.  He was referred by [facility].  This is seen as a personal injury.  Symptoms began about 1 month ago and did improve, but returned/worsened 3 days ago.  There is no history of trauma/injury.  He has been more active lately.  Pain is located along his Achilles tendon.  He denies any obvious prior history of discomfort there.  Symptoms are worse with plantarflexion.  He did try Tylenol without relief.  He has used IcyHot with relief.  He has used compression socks with relief.  He was given a tall walking boot in the ER, which helps.  He was also prescribed [place] in the ER.  He has not taken NSAIDs.  He works as an auctioneer.  At this time, he does not participate in any specific physical activities/sports        Past Medical History:   Diagnosis Date    Hypertension     Sleep apnea        Past Surgical History:   Procedure Laterality Date    LAPAROSCOPIC APPENDECTOMY  1/1/13    Laparoscopic appendectomy       Current Outpatient Medications on File Prior to Visit   Medication Sig Dispense Refill    HYDROcodone-acetaminophen (NORCO) 5-325 MG per tablet Take 1 tablet by mouth every 4 hours as needed for Pain for up to 3 days. Intended supply: 3 days. Take lowest dose possible to manage pain Max Daily Amount: 6 tablets 18 tablet 0    ondansetron (ZOFRAN) 4 MG tablet Take 1 tablet by mouth every 8 hours as needed for Nausea 20 tablet 0    naproxen (NAPROSYN) 500 MG tablet Take 1 tablet by mouth 2 times daily 20 tablet 0    LISINOPRIL PO Take by mouth      atorvastatin (LIPITOR) 10 MG tablet Take 40 mg by mouth daily       Omega-3 Fatty Acids (OMEGA-3 FISH OIL PO) Take by mouth 2 times daily      omega-3 acid ethyl esters (LOVAZA) 1 g capsule Take 2 g by mouth 2 times daily      amLODIPine (NORVASC) 10 MG tablet Take 10 mg by mouth daily